# Patient Record
Sex: MALE | Race: WHITE | NOT HISPANIC OR LATINO | URBAN - METROPOLITAN AREA
[De-identification: names, ages, dates, MRNs, and addresses within clinical notes are randomized per-mention and may not be internally consistent; named-entity substitution may affect disease eponyms.]

---

## 2021-05-19 ENCOUNTER — OUT OF OFFICE VISIT (OUTPATIENT)
Dept: URBAN - METROPOLITAN AREA MEDICAL CENTER 9 | Facility: MEDICAL CENTER | Age: 71
End: 2021-05-19
Payer: MEDICARE

## 2021-05-19 ENCOUNTER — TELEPHONE ENCOUNTER (OUTPATIENT)
Dept: URBAN - METROPOLITAN AREA CLINIC 74 | Facility: CLINIC | Age: 71
End: 2021-05-19

## 2021-05-19 DIAGNOSIS — R79.89 ABNORMAL C-REACTIVE PROTEIN: ICD-10-CM

## 2021-05-19 DIAGNOSIS — R10.84 ABDOMINAL CRAMPING, GENERALIZED: ICD-10-CM

## 2021-05-19 DIAGNOSIS — R11.2 ACUTE NAUSEA WITH NONBILIOUS VOMITING: ICD-10-CM

## 2021-05-19 PROCEDURE — 99223 1ST HOSP IP/OBS HIGH 75: CPT | Performed by: INTERNAL MEDICINE

## 2021-05-19 PROCEDURE — G8427 DOCREV CUR MEDS BY ELIG CLIN: HCPCS | Performed by: INTERNAL MEDICINE

## 2021-05-20 ENCOUNTER — OUT OF OFFICE VISIT (OUTPATIENT)
Dept: URBAN - METROPOLITAN AREA MEDICAL CENTER 9 | Facility: MEDICAL CENTER | Age: 71
End: 2021-05-20
Payer: MEDICARE

## 2021-05-20 DIAGNOSIS — K31.89 ACQUIRED DEFORMITY OF DUODENUM: ICD-10-CM

## 2021-05-20 DIAGNOSIS — K22.8 COLUMNAR-LINED ESOPHAGUS: ICD-10-CM

## 2021-05-20 DIAGNOSIS — R11.2 ACUTE NAUSEA WITH NONBILIOUS VOMITING: ICD-10-CM

## 2021-05-20 PROCEDURE — 43239 EGD BIOPSY SINGLE/MULTIPLE: CPT | Performed by: INTERNAL MEDICINE

## 2021-06-16 ENCOUNTER — OFFICE VISIT (OUTPATIENT)
Dept: URBAN - METROPOLITAN AREA CLINIC 74 | Facility: CLINIC | Age: 71
End: 2021-06-16

## 2021-06-24 ENCOUNTER — LAB OUTSIDE AN ENCOUNTER (OUTPATIENT)
Dept: URBAN - METROPOLITAN AREA CLINIC 40 | Facility: CLINIC | Age: 71
End: 2021-06-24

## 2021-06-24 ENCOUNTER — OFFICE VISIT (OUTPATIENT)
Dept: URBAN - METROPOLITAN AREA CLINIC 40 | Facility: CLINIC | Age: 71
End: 2021-06-24
Payer: MEDICARE

## 2021-06-24 ENCOUNTER — WEB ENCOUNTER (OUTPATIENT)
Dept: URBAN - METROPOLITAN AREA CLINIC 40 | Facility: CLINIC | Age: 71
End: 2021-06-24

## 2021-06-24 DIAGNOSIS — R13.13 DYSPHAGIA, PHARYNGEAL PHASE: ICD-10-CM

## 2021-06-24 DIAGNOSIS — Z86.010 HX OF COLONIC POLYPS: ICD-10-CM

## 2021-06-24 DIAGNOSIS — K21.9 GERD WITHOUT ESOPHAGITIS: ICD-10-CM

## 2021-06-24 DIAGNOSIS — Z79.01 BLOOD THINNED DUE TO LONG-TERM ANTICOAGULANT USE: ICD-10-CM

## 2021-06-24 DIAGNOSIS — Z95.0 PACEMAKER: ICD-10-CM

## 2021-06-24 PROBLEM — 441509002: Status: ACTIVE | Noted: 2021-06-24

## 2021-06-24 PROBLEM — 711150003: Status: ACTIVE | Noted: 2021-06-24

## 2021-06-24 PROCEDURE — 99214 OFFICE O/P EST MOD 30 MIN: CPT | Performed by: INTERNAL MEDICINE

## 2021-06-24 RX ORDER — CARVEDILOL 3.12 MG/1
TABLET, FILM COATED ORAL
Qty: 0 | Refills: 0 | Status: DISCONTINUED | COMMUNITY
Start: 1900-01-01

## 2021-06-24 RX ORDER — PANTOPRAZOLE SODIUM 40 MG
TAKE ONE TABLET BY MOUTH EVERY DAY AS DIRECTED TABLET, DELAYED RELEASE (ENTERIC COATED) ORAL
Qty: 30 | Refills: 3 | Status: ACTIVE | COMMUNITY
Start: 2017-02-22

## 2021-06-24 RX ORDER — ALLOPURINOL 300 MG/1
TABLET ORAL
Qty: 0 | Refills: 0 | Status: ACTIVE | COMMUNITY
Start: 1900-01-01

## 2021-06-24 RX ORDER — AMIODARONE HYDROCHLORIDE 200 MG/1
TABLET ORAL
Qty: 0 | Refills: 0 | Status: ACTIVE | COMMUNITY
Start: 1900-01-01

## 2021-06-24 RX ORDER — IPRATROPIUM BROMIDE AND ALBUTEROL 20; 100 UG/1; UG/1
SPRAY, METERED RESPIRATORY (INHALATION)
Qty: 0 | Refills: 0 | Status: ACTIVE | COMMUNITY
Start: 1900-01-01

## 2021-06-24 RX ORDER — WARFARIN 3 MG/1
TABLET ORAL
Qty: 0 | Refills: 0 | Status: ACTIVE | COMMUNITY
Start: 1900-01-01

## 2021-06-24 RX ORDER — APREMILAST 30 MG/1
TABLET, FILM COATED ORAL 2
Qty: 0 | Refills: 0 | Status: ACTIVE | COMMUNITY
Start: 1900-01-01

## 2021-06-24 NOTE — HPI-TODAY'S VISIT:
Hospital follow up. Seen for other reasons and CT noted thickened esophagus.  EGD negative other than possible Candida, but Bx negative for infection and Daniel's. Diflucan completed emperically. Hx colon polyps 2015, due for surveillance. CBC/CMP essentially wnl (Cr 1.4), LFT normal. ----  Pranay Lim is a 70 y.o. yo male who was admitted on 5/18/2021 for Hyperglycemia, nausea, and vomiting. The patient with a known history of Atrial Fibrillation, Long term current use of anticoagulant, On Coumadin, CHF with ECHO on 07/2020 EF 51-55%, Chronic Right pleural effusion, Cardiomyopathy, nonischemic, VHD,  History of atrial septal defect, S/P ASD Repair in 2003, History of mitral valve prolapse, S/P MV Repair in 2014, S/P Cardiac pacemaker in 2014, CAD, S/P CABG in 2014, TARYN on CPAP, HTN, HLP, DM, Hypothyroidism, CKD, BPH, Personal history of colon polyps, Diverticulosis, Recurrent Diverticulitis, S/P Azam surgery in 2014, S/P Colostomy revision in 2015, Last Colonoscopy in 2015, GERD, Esophagitis, Gastritis, Hiatal Hernia, Last EGD in 2011, Monoclonal Gammopathy, Thrombocytopenia, COPD, Psoriasis, Nephrolithiasis, S/P Cholecystectomy, and Arthritis presented to ED at  with complaints of low back pain that began and leg paint. He has chronic low back pain and it was worsened yesterday evening.  He was sitting in a recliner with pain became very severe.  He has had no new trauma that he has not had any heavy lifting or moving. No numbness.  He also states he is mildly short of breath but this is consistent with his COPD.  He says the shortness of breath is not much more than it is normal. He also sated that he had two episodes of nausea and vomiting since yesterday. His pain is better today. No more nausea or vomiting. He stated that he has diarrhea occasionally since his colon surgery. He takes Imodium as needed. He does not take any medications at home for GERD. He takes Advil on and off for pain. The patient denies dyspepsia, dysphagia, odynophagia, hemoptysis, hematemesis, melena, constipation, abdominal pain, hematochezia, fever, chills, chest pain, SOB, or any other GI complaints today. He denies ETOH, Tobacco (Former), and Illicit drug abuse. He is up to date with his Flu, Pneumonia, and COVID vaccine.       Procedures: Colonoscopy with polypectomy on 03/12/2015 with Dr. Schaeffer noted three small polyps identified in the transverse colon, minimal diverticulosis, Azam pouch and colostomy were healthy. The entire Azam pouch was not fully inspected due to mucous, but no gross lesions. Retroflexion not performed. Biopsy with tubular adenoma colon polyps.    EGD with biopsy on 03/11/2014 with Dr. Schaeffer noted esophagitis, gastritis, hiatal hernia, and normal duodenum. Biopsy with chronic inflammation.   The patient psychosocial needs (emotional, behavioral) are appropriate at the present time considering the patient clinical condition.  ---- DATE OF ADMISSION: 05/18/2021   DATE OF PROCEDURE: 05/20/2021   PROCEDURE:  Incomplete Upper endoscopy with biopsy.   PROCEDURE PERFORMED BY:  PETERSON MATTHEW MD   PREOPERATIVE DIAGNOSIS:  Abnormal thickening of esophagus noted on CT, nausea and vomiting.   POSTPROCEDURE DIAGNOSIS:  Probable Candida esophagitis, food residue in the stomach.   DESCRIPTION OF PROCEDURE:  Informed consent was obtained from the patient after explaining risks and benefits of the procedure.  The patient was placed in the left lateral decubitus position.  Sedation per anesthesia was given.  Bite block was inserted.  GIF Olympus endoscope 180 was inserted from the mouth to the gastric antrum.  The scope could not be advanced past that due to large amount of food, so unable to visualize the pylorus.   FINDINGS:  Large amount of food precluding good visualization of underlying mucosa.  This occupied most of the stomach.  Thus pylorus could not be identified.  Mild gastritis was noted of the visualized portion of the stomach.  Biopsies were done from the body and antrum to rule out H pylori.  Whitish exudates noted in the esophagus.  These were biopsied to rule out Candida esophagitis.   COMPLICATIONS:  None.   ESTIMATED BLOOD LOSS:  Minimal.   IMPRESSION:  Likely Candida esophagitis causing thickening of the esophagus.  No obvious malignancy was noted.   PLAN:  We will start bethanechol for probable gastroparesis.  Avoid narcotics.  We will sign off.  Follow up with us as outpatient for further management.  Start Diflucan.

## 2021-06-25 PROBLEM — 266435005: Status: ACTIVE | Noted: 2021-06-24

## 2021-06-25 PROBLEM — 21101000119105: Status: ACTIVE | Noted: 2021-06-24

## 2021-06-25 PROBLEM — 428283002: Status: ACTIVE | Noted: 2021-06-24

## 2021-09-01 ENCOUNTER — TELEPHONE ENCOUNTER (OUTPATIENT)
Dept: URBAN - METROPOLITAN AREA CLINIC 40 | Facility: CLINIC | Age: 71
End: 2021-09-01

## 2021-09-09 ENCOUNTER — OFFICE VISIT (OUTPATIENT)
Dept: URBAN - METROPOLITAN AREA MEDICAL CENTER 9 | Facility: MEDICAL CENTER | Age: 71
End: 2021-09-09
Payer: MEDICARE

## 2021-09-09 DIAGNOSIS — K29.60 ADENOPAPILLOMATOSIS GASTRICA: ICD-10-CM

## 2021-09-09 DIAGNOSIS — Z86.010 ADENOMAS PERSONAL HISTORY OF COLONIC POLYPS: ICD-10-CM

## 2021-09-09 DIAGNOSIS — R13.19 CERVICAL DYSPHAGIA: ICD-10-CM

## 2021-09-09 DIAGNOSIS — R10.13 ABDOMINAL DISCOMFORT, EPIGASTRIC: ICD-10-CM

## 2021-09-09 DIAGNOSIS — K21.9 ACID REFLUX: ICD-10-CM

## 2021-09-09 DIAGNOSIS — K31.7 BENIGN GASTRIC POLYP: ICD-10-CM

## 2021-09-09 DIAGNOSIS — D12.4 ADENOMA OF DESCENDING COLON: ICD-10-CM

## 2021-09-09 DIAGNOSIS — K22.2 ACQUIRED ESOPHAGEAL RING: ICD-10-CM

## 2021-09-09 PROCEDURE — 43450 DILATE ESOPHAGUS 1/MULT PASS: CPT | Performed by: INTERNAL MEDICINE

## 2021-09-09 PROCEDURE — 43239 EGD BIOPSY SINGLE/MULTIPLE: CPT | Performed by: INTERNAL MEDICINE

## 2021-09-09 PROCEDURE — 43251 EGD REMOVE LESION SNARE: CPT | Performed by: INTERNAL MEDICINE

## 2021-09-09 PROCEDURE — 45385 COLONOSCOPY W/LESION REMOVAL: CPT | Performed by: INTERNAL MEDICINE

## 2024-04-29 ENCOUNTER — LAB (OUTPATIENT)
Dept: URBAN - METROPOLITAN AREA CLINIC 40 | Facility: CLINIC | Age: 74
End: 2024-04-29

## 2024-04-29 ENCOUNTER — OV EP (OUTPATIENT)
Dept: URBAN - METROPOLITAN AREA CLINIC 40 | Facility: CLINIC | Age: 74
End: 2024-04-29
Payer: MEDICARE

## 2024-04-29 VITALS
WEIGHT: 205 LBS | SYSTOLIC BLOOD PRESSURE: 126 MMHG | HEIGHT: 73 IN | DIASTOLIC BLOOD PRESSURE: 84 MMHG | BODY MASS INDEX: 27.17 KG/M2 | HEART RATE: 80 BPM | TEMPERATURE: 96.8 F

## 2024-04-29 DIAGNOSIS — D69.6 THROMBOCYTOPENIA: ICD-10-CM

## 2024-04-29 DIAGNOSIS — K59.04 CHRONIC IDIOPATHIC CONSTIPATION: ICD-10-CM

## 2024-04-29 DIAGNOSIS — Z90.49 HISTORY OF PARTIAL COLECTOMY: ICD-10-CM

## 2024-04-29 DIAGNOSIS — Z86.010 HX OF COLONIC POLYPS: ICD-10-CM

## 2024-04-29 DIAGNOSIS — Z79.01 BLOOD THINNED DUE TO LONG-TERM ANTICOAGULANT USE: ICD-10-CM

## 2024-04-29 DIAGNOSIS — Z95.0 PACEMAKER: ICD-10-CM

## 2024-04-29 DIAGNOSIS — K21.9 GERD WITHOUT ESOPHAGITIS: ICD-10-CM

## 2024-04-29 DIAGNOSIS — R13.13 DYSPHAGIA, PHARYNGEAL PHASE: ICD-10-CM

## 2024-04-29 DIAGNOSIS — K58.2 MIXED IRRITABLE BOWEL SYNDROME: ICD-10-CM

## 2024-04-29 PROBLEM — 82934008: Status: ACTIVE | Noted: 2024-04-29

## 2024-04-29 PROBLEM — 428305005: Status: ACTIVE | Noted: 2024-04-29

## 2024-04-29 PROBLEM — 440544005: Status: ACTIVE | Noted: 2024-04-29

## 2024-04-29 PROBLEM — 302215000: Status: ACTIVE | Noted: 2024-04-29

## 2024-04-29 PROCEDURE — 99214 OFFICE O/P EST MOD 30 MIN: CPT | Performed by: INTERNAL MEDICINE

## 2024-04-29 RX ORDER — LEVOTHYROXINE SODIUM 75 UG/1
1 TABLET IN THE MORNING ON AN EMPTY STOMACH TABLET ORAL ONCE A DAY
Status: ACTIVE | COMMUNITY

## 2024-04-29 RX ORDER — METOPROLOL SUCCINATE 50 MG/1
TABLET, EXTENDED RELEASE ORAL
Qty: 60 TABLET | Status: ACTIVE | COMMUNITY

## 2024-04-29 RX ORDER — WARFARIN SODIUM 3 MG/1
TABLET ORAL
Qty: 135 TABLET | Status: ACTIVE | COMMUNITY

## 2024-04-29 RX ORDER — BENZONATATE 100 MG/1
1 CAPSULE AS NEEDED CAPSULE ORAL THREE TIMES A DAY
Status: ACTIVE | COMMUNITY

## 2024-04-29 RX ORDER — LINACLOTIDE 72 UG/1
1 CAPSULE, GELATIN COATED ORAL ONCE A DAY
Qty: 90 | Refills: 3 | OUTPATIENT
Start: 2024-04-29 | End: 2025-04-24

## 2024-04-29 RX ORDER — DIGOXIN 125 UG/1
1 TABLET TABLET ORAL
Status: ACTIVE | COMMUNITY

## 2024-04-29 RX ORDER — GABAPENTIN 300 MG/1
CAPSULE ORAL
Qty: 180 CAPSULE | Status: ACTIVE | COMMUNITY

## 2024-04-29 RX ORDER — PROPRANOLOL HYDROCHLORIDE 80 MG/1
CAPSULE, EXTENDED RELEASE ORAL
Qty: 30 CAPSULE | Status: ACTIVE | COMMUNITY

## 2024-04-29 RX ORDER — PANTOPRAZOLE SODIUM 40 MG
TAKE ONE TABLET BY MOUTH EVERY DAY AS DIRECTED TABLET, DELAYED RELEASE (ENTERIC COATED) ORAL
Qty: 30 | Refills: 3 | COMMUNITY
Start: 2017-02-22

## 2024-04-29 RX ORDER — ALLOPURINOL 300 MG/1
TABLET ORAL
Qty: 90 TABLET | Status: ACTIVE | COMMUNITY

## 2024-04-29 NOTE — HPI-TODAY'S VISIT:
Referral by Dr. Velásquez. Seen last in 2021 after hospital evaluation. Now with IBS symptoms, mostly constipation, some diarrhea, no a/a factors. Lower abd pain, sharp, lasts 20 minutes then lets up, better after BMs. No bleeding. Hx LARGE TA colon polyps 2021, colonoscopy, polypectomy. Due for surveillance 9/2024. Hx hiatal hernia and GERD and esophagitis. Seen for other reasons 2021 and CT noted thickened esophagus.  EGD 2021 negative other than possible Candida, but Bx negative for infection and Daniel's. Diflucan completed emperically. Hx colon polyps 2015, and also large polyps 2021, see above. Labs 4/2024: Hgb 12.9, plt 84, wbc 6.14. Cr 1.26. LFT normal.  ---- Prior Note: Pranay Lim is a 70 y.o. yo male who was admitted on 5/18/2021 for Hyperglycemia, nausea, and vomiting. The patient with a known history of Atrial Fibrillation, Long term current use of anticoagulant, On Coumadin, CHF with ECHO on 07/2020 EF 51-55%, Chronic Right pleural effusion, Cardiomyopathy, nonischemic, VHD,  History of atrial septal defect, S/P ASD Repair in 2003, History of mitral valve prolapse, S/P MV Repair in 2014, S/P Cardiac pacemaker in 2014, CAD, S/P CABG in 2014, TARYN on CPAP, HTN, HLP, DM, Hypothyroidism, CKD, BPH, Personal history of colon polyps, Diverticulosis, Recurrent Diverticulitis, S/P Azam surgery in 2014, S/P Colostomy revision in 2015, Last Colonoscopy in 2015, GERD, Esophagitis, Gastritis, Hiatal Hernia, Last EGD in 2011, Monoclonal Gammopathy, Thrombocytopenia, COPD, Psoriasis, Nephrolithiasis, S/P Cholecystectomy, and Arthritis presented to ED at  with complaints of low back pain that began and leg paint. He has chronic low back pain and it was worsened yesterday evening.  He was sitting in a recliner with pain became very severe.  He has had no new trauma that he has not had any heavy lifting or moving. No numbness.  He also states he is mildly short of breath but this is consistent with his COPD.  He says the shortness of breath is not much more than it is normal. He also sated that he had two episodes of nausea and vomiting since yesterday. His pain is better today. No more nausea or vomiting. He stated that he has diarrhea occasionally since his colon surgery. He takes Imodium as needed. He does not take any medications at home for GERD. He takes Advil on and off for pain. The patient denies dyspepsia, dysphagia, odynophagia, hemoptysis, hematemesis, melena, constipation, abdominal pain, hematochezia, fever, chills, chest pain, SOB, or any other GI complaints today. He denies ETOH, Tobacco (Former), and Illicit drug abuse. He is up to date with his Flu, Pneumonia, and COVID vaccine.       Procedures: Colonoscopy with polypectomy on 03/12/2015 with Dr. Schaeffer noted three small polyps identified in the transverse colon, minimal diverticulosis, Azam pouch and colostomy were healthy. The entire Azam pouch was not fully inspected due to mucous, but no gross lesions. Retroflexion not performed. Biopsy with tubular adenoma colon polyps.    EGD with biopsy on 03/11/2014 with Dr. Schaeffer noted esophagitis, gastritis, hiatal hernia, and normal duodenum. Biopsy with chronic inflammation.   The patient psychosocial needs (emotional, behavioral) are appropriate at the present time considering the patient clinical condition.

## 2024-05-29 ENCOUNTER — TELEPHONE ENCOUNTER (OUTPATIENT)
Dept: URBAN - METROPOLITAN AREA CLINIC 40 | Facility: CLINIC | Age: 74
End: 2024-05-29